# Patient Record
Sex: FEMALE | URBAN - METROPOLITAN AREA
[De-identification: names, ages, dates, MRNs, and addresses within clinical notes are randomized per-mention and may not be internally consistent; named-entity substitution may affect disease eponyms.]

---

## 2024-09-15 ENCOUNTER — HOSPITAL ENCOUNTER (OUTPATIENT)
Facility: CLINIC | Age: 34
Discharge: HOME | End: 2024-09-15
Attending: OBSTETRICS & GYNECOLOGY | Admitting: OBSTETRICS & GYNECOLOGY

## 2024-09-15 ENCOUNTER — HOSPITAL ENCOUNTER (OUTPATIENT)
Facility: CLINIC | Age: 34
End: 2024-09-15
Admitting: OBSTETRICS & GYNECOLOGY

## 2024-09-15 ENCOUNTER — APPOINTMENT (OUTPATIENT)
Dept: ULTRASOUND IMAGING | Facility: CLINIC | Age: 34
End: 2024-09-15
Attending: OBSTETRICS & GYNECOLOGY

## 2024-09-15 VITALS
SYSTOLIC BLOOD PRESSURE: 121 MMHG | TEMPERATURE: 98.8 F | DIASTOLIC BLOOD PRESSURE: 74 MMHG | OXYGEN SATURATION: 97 % | RESPIRATION RATE: 18 BRPM

## 2024-09-15 PROBLEM — O36.80X0 ENCOUNTER FOR ASSESSMENT FOR FETAL DEMISE: Status: ACTIVE | Noted: 2024-09-15

## 2024-09-15 PROBLEM — O36.4XX0 FETAL DEMISE, GREATER THAN 22 WEEKS, ANTEPARTUM: Status: ACTIVE | Noted: 2024-09-15

## 2024-09-15 LAB
CRYSTALS AMN MICRO: NORMAL
RADIOLOGIST FLAGS: ABNORMAL
RUPTURE OF FETAL MEMBRANES BY ROM PLUS: NEGATIVE

## 2024-09-15 PROCEDURE — 250N000013 HC RX MED GY IP 250 OP 250 PS 637: Performed by: OBSTETRICS & GYNECOLOGY

## 2024-09-15 PROCEDURE — 84112 EVAL AMNIOTIC FLUID PROTEIN: CPT | Performed by: OBSTETRICS & GYNECOLOGY

## 2024-09-15 PROCEDURE — G0463 HOSPITAL OUTPT CLINIC VISIT: HCPCS | Mod: 25

## 2024-09-15 PROCEDURE — 76805 OB US >/= 14 WKS SNGL FETUS: CPT

## 2024-09-15 RX ORDER — CALCIUM CARBONATE 500 MG/1
1000 TABLET, CHEWABLE ORAL ONCE
Status: COMPLETED | OUTPATIENT
Start: 2024-09-15 | End: 2024-09-15

## 2024-09-15 RX ORDER — VITAMIN A, VITAMIN C, VITAMIN D-3, VITAMIN E, VITAMIN B-1, VITAMIN B-2, NIACIN, VITAMIN B-6, CALCIUM, IRON, ZINC, COPPER 4000; 120; 400; 22; 1.84; 3; 20; 10; 1; 12; 200; 27; 25; 2 [IU]/1; MG/1; [IU]/1; MG/1; MG/1; MG/1; MG/1; MG/1; MG/1; UG/1; MG/1; MG/1; MG/1; MG/1
1 TABLET ORAL DAILY
COMMUNITY

## 2024-09-15 RX ADMIN — CALCIUM CARBONATE (ANTACID) CHEW TAB 500 MG 1000 MG: 500 CHEW TAB at 08:48

## 2024-09-15 ASSESSMENT — ACTIVITIES OF DAILY LIVING (ADL)
ADLS_ACUITY_SCORE: 35
ADLS_ACUITY_SCORE: 18
ADLS_ACUITY_SCORE: 18

## 2024-09-15 NOTE — PLAN OF CARE
Unable to obtain FHTS with EFM or doptone. Dr. Rosales asked to come to bedside to confirm with ultrasound.

## 2024-09-15 NOTE — PROVIDER NOTIFICATION
09/15/24 0816   Provider Notification   Provider Name/Title Honorio   Method of Notification Electronic Page     MD notified of pt arrival

## 2024-09-15 NOTE — PROVIDER NOTIFICATION
09/15/24 0755   Provider Notification   Provider Name/Title Bobby   Method of Notification At Bedside   Request Evaluate in Person     In House MD at bedside to confirm no fhts. Orders received for bedside ultrasound.

## 2024-09-15 NOTE — H&P
Emergency OB In-House, H&P    Ciara Mancuso is a 34 year old  at 34 4/7 weeks gestation  who presents to labor and delivery with decreased fetal movement and contractions starting last evening.  She also states she woke up with wet shorts and questions continued leakage, no bleeding.  Decreased FM since about 8 pm.  Sadly, on arrival, bedside US shows IUFD, cephalic fetus, anterior placenta, MICHELLE subjectively normal.    Had BPP of 8/10 on 9/10/24    Pregnancy complicated by   --IUI pregnancy 24  --rubella nonimmune  --hyperemesis  --COVID-19 infection 24  --DIETER on US at 7 weeks  --possible early changes of cystic hygroma at 12+6 weeks, NIPS normal XY.  AFP normal  --f/u targeted US 24 at 31 1/7 showed lagging femur length, mild ureteral dract dilation,  no other findings  --rubella nonimmune  --hx gestational HTN last pregnancy, this pregnancy normal so far     Ciara denies any other recent illness, fever, rash, exposure.  Wife Jaycob here, confirms.    PMHx  Anxiety  GERD  HSV-oral only    PSHx:  Bladder surgery age 15 for reflux  Breast surgery    POBhx   7# 2 oz Luba     Meds:    PNV, Protonix, ASA 81 mg    Allergies:  sulfa, trimethoprim    Fam Hx:  no malignancies.  Stroke in a grand parent      OB History    Para Term  AB Living   2 1 1 0 0 1   SAB IAB Ectopic Multiple Live Births   0 0 0 0 1      # Outcome Date GA Lbr Giovanni/2nd Weight Sex Type Anes PTL Lv   2 Current            1 Term 24 39w3d 96:29 / 09:53 3.22 kg (7 lb 1.6 oz) F Vag-Spont  N BREANN      Complications: Preeclampsia/Hypertension      Name: Luba      Apgar1: 8  Apgar5: 9          ROS: Negative for any illness, rash, fever, no deli meats (vegetarian)    OBJECTIVE:  Blood pressure 121/74, temperature 98.8  F (37.1  C), temperature source Oral, resp. rate 18.      WDWN gravid woman in NAD, appropriately tearful  Lungs clear  CV RRR  Abd gravid, nontender     SVE: closed at internal os/30%/-5,  soft    [place]:  not picking up any regular ctx.  Not picking up ctx, RN earlier palpated mild ctx.  [place] on tight and in proper placement.   No labor.  FHT absent.    Radiology US to confirm:    INDICATION: decreased fetal movement, check for viable fetus  COMPARISON: None available  TECHNIQUE: Routine.     FINDINGS: Single intrauterine gestation, cephalic presentation. Placenta is located anteriorly. Amniotic fluid is normal. No fetal movement.     BIOMETRY:  Biparietal Diameter: 8.1 cm, 32 weeks and 5 days.  Head Circumference: 30.0 cm, 33 weeks and 3 days  Abdominal Circumference: 30.0 cm, 34 weeks and 0 days  Femur Length: 6.5 cm, 34 weeks and 4 days     Estimated Fetal Weight: 2236 g  EFW Percentile: 20%     Fetal Heart Rate: 0 bpm.  Cervical Length: 4.4 cm     EDC by prior reported dating: 10/23/2024  EDC by This US exam: 10/31/2024     Composite Age by prior reported datin weeks and 4 days   Composite Age by This US: 33 weeks and 3 days                                                                      IMPRESSION:    1.  Findings consistent with fetal demise.  2.  The fetus is in cephalic position and measures 33 weeks and 3 days by today's ultrasound.   This result has not been signed. Information might be incomplete.     IMPRESSION:    1.  Findings consistent with fetal demise.  2.  The fetus is in cephalic position and measures 33 weeks and 3 days by today's ultrasound. The gestational age by prior reported dating is 34 weeks and 4 days.  3.  Low normal amniotic fluid.     [Critical Result: Fetal demise]     Finding was identified on 9/15/2024 8:42 AM CDT.      Dr. Olmedo was contacted by me on 9/15/2024 9:10 AM CDT and verbalized understanding of the critical result.      ASSESSMENT:  34 year old yo  at 34 4/7 weeks with IUFD, unknown etiology.  Pregnancy complicated by   --IUI pregnancy 24  --rubella nonimmune  --hyperemesis  --COVID-19 infection 24  --DIETER on US at 7  weeks  --possible early changes of cystic hygroma at 12+6 weeks, NIPS normal XY.  AFP normal  --f/u targeted US 24 at 31 1/7 showed lagging femur length, mild ureteral dract dilation,  no other findings  --rubella nonimmune  --hx gestational HTN last pregnancy, this pregnancy normal so far    PLAN:    R/o PROM.  Fern and ROM plus negative.  [employer]--r/o labor.  No regular ctx.  RN palpated mild x 2 on arrival.  None picking up now. Cervix is closed at internal os for now.  Discussed diagnosis with Ciara and her wife Jaycob.  They are of course devastated.  Discussed that this is not her fault.  Hopefully will find an answer with delivery as to cause of loss.  There is a fairly extensive work up to do, which can include blood tests, cultures, autopsy.  They will have the right to decide how far to take that work up.  There is a significant chance that no cause will be found, but we are happy to search for a cause, and recommend workup to help with management of future pregnancies, as well as emotionally processing this loss.    I spoke with [facility]. Ciara would like to deliver there ([facility] on divert this morning, so Karie came here to [facility], but would like to deliver in their usual hospital, [facility]).  I notified  loss (Le[place]) and the plan is for the team to call Ciara tomorrow to work through the process, what to anticipate, and schedule induction of labor.    Questions answered for now.  Ciara is not in labor, and testing for PRM is negative.  They are instructed to call [facility] if bleeding, LOF, painful ctx.    I spoke with WENCESLAO Andrews ([facility]) who will call Amalia this afternoon to start the process and offer support, facilitate admission/induction.    Our sympathy to Karie, from myself, Rama, and [facility] OB staff.    Jessica Rosales MD  September 15, 2024

## 2024-09-15 NOTE — PLAN OF CARE
Data: Patient assessed in the [facility] for decreased fetal movement.  Cervical exam closed.  Membranes intact.  Contractions/uterine assessment performed.  Action:  Pt to discharge home with follow up for care at [facility] as per pt and spouse desire. Discharge instructions reviewed.  Patient instructed to report change in vaginal leaking of fluid or bleeding, abdominal pain, or any concerns related to the pregnancy to her nurse/physician.  Pt given information regarding grief/loss support and resources for emotional help.  Response: Orders to discharge home per Jessica Rosales.  Patient verbalized understanding of education and verbalized agreement with plan. Discharged to home at 0955.

## 2024-09-15 NOTE — PLAN OF CARE
Data: Patient presented to [facility]: 9/15/2024  7:32 AM.  Reason for maternal/fetal assessment is uterine contractions, decreased fetal movement. Patient reports contractions and dfm since 5311-4035 starting last evening .  Patient is a .  Prenatal record reviewed. Pregnancy has been uncomplicated..  Gestational Age Unknown. VSS. Fetal movement decreased since . Patient denies vaginal bleeding, abdominal pain, nausea, vomiting, headache, visual disturbances, epigastric or URQ pain, significant edema. Support person is present.   Action: Verbal consent for EFM. Dr. Rosales at bedside to confirm with ultrasound no fhts. Orders received for bedside ultrasound from radiology.  Response: Patient verbalized agreement with plan. Will contact Dr Blanco Olmedo with update and for further orders.

## 2024-09-15 NOTE — DISCHARGE INSTRUCTIONS
[facility] [phone]  A nurse will call this afternoon to arrange next steps for your care.  If you have bleeding, painful contractions, leaking fluid or any questions please call number above.  Our deepest condolences for your loss, [facility]